# Patient Record
Sex: MALE | URBAN - METROPOLITAN AREA
[De-identification: names, ages, dates, MRNs, and addresses within clinical notes are randomized per-mention and may not be internally consistent; named-entity substitution may affect disease eponyms.]

---

## 2024-04-18 ENCOUNTER — NON-PROVIDER VISIT (OUTPATIENT)
Dept: NEUROLOGY | Facility: MEDICAL CENTER | Age: 29
End: 2024-04-18
Attending: SPECIALIST

## 2024-04-18 DIAGNOSIS — R20.0 NUMBNESS: ICD-10-CM

## 2024-04-18 PROCEDURE — 95886 MUSC TEST DONE W/N TEST COMP: CPT | Performed by: SPECIALIST

## 2024-04-18 PROCEDURE — 95911 NRV CNDJ TEST 9-10 STUDIES: CPT | Performed by: SPECIALIST

## 2024-04-18 PROCEDURE — 95911 NRV CNDJ TEST 9-10 STUDIES: CPT | Mod: 26 | Performed by: SPECIALIST

## 2024-04-18 PROCEDURE — 95886 MUSC TEST DONE W/N TEST COMP: CPT | Mod: 26 | Performed by: SPECIALIST

## 2024-04-18 PROCEDURE — 95912 NRV CNDJ TEST 11-12 STUDIES: CPT | Performed by: SPECIALIST

## 2024-04-18 NOTE — PROCEDURES
NERVE CONDUCTION STUDIES AND ELECTROMYOGRAPHY REPORT        04/18/24      Referring provider: No primary care provider on file.      SUMMARY OF PATIENT'S CLINICAL HISTORY,PHYSICAL EXAM, AND RATIONALE FOR TESTING:    Mr. [place] 29 y.o. presenting with bilateral hand numbness which is intermittent.    Past Medical History is significant for : No past medical history on file.    The electrodiagnostic studies were performed to evaluate for possible peripheral neuropathy versus radiculopathy.      ELECTRODIAGNOSTIC EXAMINATION:  Nerve conduction studies (NCS) and electromyography (EMG) are utilized to evaluate direct or indirect damage to the peripheral nervous system. NCS are performed to measure the nerve(s) response(s) to electrostimulation across a given nerve segment. EMG evaluates the passive and active electrical activity of the muscle(s) in question.  Muscles are innervated by specific peripheral nerves and roots. Often times, several nerves the muscle to be examined in order to determine the presence or absence of the disease process. Furthermore, nerves and muscles may need to be tested in a [id number] comparison, as well as in additional extremities, as this may be crucial in characterizing the extent of the disease process, which may be diffuse or isolated and of varying degree of severity. The extent of the neurodiagnostic exam is justified as it may help arrive to a proper diagnosis, which ultimately may contribute to better management of the patient. Therefore, the nerves to muscles examined during the study were medically necessary.    Unless otherwise noted, temperature of the extremity(s) study was monitored before and during the examination and remained between 32 and 36 degrees C for the upper extremities, and between 30 and 36 degrees C for the lower extremities.      NERVE CONDUCTION STUDIES:  Sensory nerves:   - Bilateral Median sensory nerves were examined.The  responses were within normal limits.  - Bilateral Ulnar sensory nerves were examined. The responses were within normal limits.  -Left median/ulnar orthodromic palmar comparison study was performed.  The responses were normal in both nerves.    Motor nerves:   - Bilateral Median motor nerves were examined. Recording electrodes placed at the Abductor Pollicis Brevis muscles. The response on the left was within normal limits.  The response on the right was abnormal.  Onset latency was prolonged at 4.0 ms, amplitude and conduction velocity were within normal limits.  - Bilateral Ulnar motor nerves were examined. Recording electrodes placed at the Abductor Digiti Minimi muscles. The responses were within normal limits.      ELECTROMYOGRAPHY:  The study was performed the concentric needle electrode. Fibrillation and fasciculation activity is graded by convention from none (0) to continuous (4+).  Needle electrode examination was performed in the following muscles: Bilateral deltoid, biceps, triceps, first dorsal interosseous, abductor pollicis brevis.  The muscles tested demonstrated normal insertional activity, normal motor unit morphology and recruitment. There were no elements suggestive of active denervation.      Nerve Conduction Studies     Stim Site NR Onset (ms) Norm Onset (ms) O-P Amp (µV) Norm O-P Amp Site1 Site2 Delta-P (ms) Dist (cm) Priyank (m/s) Norm Priyank (m/s)   Left Median Anti Sensory (2nd Digit)   Wrist    2.7 <3.4 34.3 >20 Wrist 2nd Digit 3.5 14.0 *40 >50   Right Median Anti Sensory (2nd Digit)   Wrist    2.4 <3.4 22.4 >20 Wrist 2nd Digit 3.4 14.0 *41 >50   Left Ulnar Anti Sensory (5th Digit)   Wrist    2.2 <3.1 12.5 >12 Wrist 5th Digit 3.3 14.0 *42 >50   Right Ulnar Anti Sensory (5th Digit)   Wrist    2.4 <3.1 16.9 >12 Wrist 5th Digit 3.4 14.0 *41 >50        Stim Site NR Onset (ms) Norm Onset (ms) O-P Amp (mV) Norm O-P Amp Site1 Site2 Delta-0 (ms) Dist (cm) Priyank (m/s) Norm Priyank (m/s)   Left Median Motor (Abd  Poll Brev)   Wrist    3.9 <3.9 9.7 >6 Elbow Wrist 5.3 29.0 55 >50   Elbow    9.2  8.0          Right Median Motor (Abd Poll Brev)   Wrist    *4.0 <3.9 10.8 >6 Elbow Wrist 5.5 31.0 56 >50   Elbow    9.5  7.5          Left Ulnar Motor (Abd Dig Min)   Wrist    2.9 <3.1 9.5 >7 B Elbow Wrist 4.4 25.0 57 >50   B Elbow    7.3  6.9  A Elbow B Elbow 1.5 10.0 67    A Elbow    8.8  6.9          Right Ulnar Motor (Abd Dig Min)   Wrist    2.7 <3.1 9.6 >7 B Elbow Wrist 4.3 26.0 60 >50   B Elbow    7.0  7.4  A Elbow B Elbow 1.5 10.0 67    A Elbow    8.5  8.1               Stim Site NR Peak (ms) Norm Peak (ms) P-T Amp (µV) Site1 Site2 Delta-P (ms) Norm Delta (ms)   Left Median/Ulnar Palm Comparison (Wrist - 8cm)   Median Palm    1.7 <2.3 19.3 Median Palm Ulnar Palm *0.4 <0.3   Ulnar Palm    2.1 <2.3 10.8                              Electromyography     Side Muscle Nerve Root Ins Act Fibs Psw Amp Dur Poly Recrt Int Pat Comment   Right Deltoid Axillary C5-6 Nml Nml Nml Nml Nml 0 Nml Nml    Right Biceps Musculocut C5-6 Nml Nml Nml Nml Nml 0 Nml Nml    Right Triceps Radial C6-7-8 Nml Nml Nml Nml Nml 0 Nml Nml    Right 1stDorInt Ulnar C8-T1 Nml Nml Nml Nml Nml 0 Nml Nml    Right Abd Poll Brev Median C8-T1 Nml Nml Nml Nml Nml 0 Nml Nml    Left Deltoid Axillary C5-6 Nml Nml Nml Nml Nml 0 Nml Nml    Left Biceps Musculocut C5-6 Nml Nml Nml Nml Nml 0 Nml Nml    Left Triceps Radial C6-7-8 Nml Nml Nml Nml Nml 0 Nml Nml    Left 1stDorInt Ulnar C8-T1 Nml Nml Nml Nml Nml 0 Nml Nml    Left Abd Poll Brev Median C8-T1 Nml Nml Nml Nml Nml 0 Nml Nml            DIAGNOSTIC INTERPRETATION:   Extensive electrodiagnostic studies were performed to the bilateral upper extremities.  The results are as follows:    1.  Right carpal tunnel syndrome which is mild electrophysiologically and not associated with motor unit changes in median nerve supplied hand muscles.    2.  Normal right ulnar nerve motor and sensory conduction studies.    3.  No evidence of  radiculopathy in selected muscles studied right upper extremity.    4.  Normal EMG and nerve conduction study left upper extremity.  Specifically left median and ulnar motor and sensory conduction studies were within normal limits, there were no acute or chronic denervation changes in selected muscles studied in the left upper extremity.  There was no evidence of left upper extremity peripheral neuropathy plexopathy or radiculopathy.  Clinical correlation is suggested.        VONDA Reaves M.D.(No note.)